# Patient Record
Sex: MALE | Race: WHITE | NOT HISPANIC OR LATINO | Employment: UNEMPLOYED | URBAN - METROPOLITAN AREA
[De-identification: names, ages, dates, MRNs, and addresses within clinical notes are randomized per-mention and may not be internally consistent; named-entity substitution may affect disease eponyms.]

---

## 2024-01-08 ENCOUNTER — OFFICE VISIT (OUTPATIENT)
Dept: PULMONOLOGY | Facility: CLINIC | Age: 29
End: 2024-01-08
Payer: COMMERCIAL

## 2024-01-08 VITALS
SYSTOLIC BLOOD PRESSURE: 138 MMHG | WEIGHT: 274.19 LBS | OXYGEN SATURATION: 98 % | HEART RATE: 83 BPM | DIASTOLIC BLOOD PRESSURE: 76 MMHG | TEMPERATURE: 97.8 F

## 2024-01-08 DIAGNOSIS — Z78.9 NON-SMOKER: ICD-10-CM

## 2024-01-08 DIAGNOSIS — R91.1 PULMONARY NODULE: Primary | ICD-10-CM

## 2024-01-08 DIAGNOSIS — R59.0 MEDIASTINAL LYMPHADENOPATHY: ICD-10-CM

## 2024-01-08 PROCEDURE — 94726 PLETHYSMOGRAPHY LUNG VOLUMES: CPT | Performed by: INTERNAL MEDICINE

## 2024-01-08 PROCEDURE — 99204 OFFICE O/P NEW MOD 45 MIN: CPT | Performed by: INTERNAL MEDICINE

## 2024-01-08 PROCEDURE — 94010 BREATHING CAPACITY TEST: CPT | Performed by: INTERNAL MEDICINE

## 2024-01-08 PROCEDURE — 94729 DIFFUSING CAPACITY: CPT | Performed by: INTERNAL MEDICINE

## 2024-01-08 RX ORDER — FLUTICASONE PROPIONATE 50 MCG
2 SPRAY, SUSPENSION (ML) NASAL DAILY
COMMUNITY
Start: 2023-12-05

## 2024-01-08 RX ORDER — CHOLECALCIFEROL (VITAMIN D3) 1250 MCG
50000 CAPSULE ORAL
COMMUNITY
Start: 2023-12-06

## 2024-01-08 NOTE — PROGRESS NOTES
PULMONARY  NOTE    Chief Complaint     Febrile illness, persistent cough, abnormal CT scan of the chest    History of Present Illness     28-year-old male referred for an abnormal CT scan of the chest    He is a non-smoker with no past history of known lung disease    He was in his usual state of good health when he was on his honeymoon in [place]  He developed a fever  He developed a persistent cough  He had a persistent cough for about 2 months and the cough is now better  No further fevers or chills    He underwent a chest x-ray which was abnormal followed by a CT scan of the chest  Those results are as noted below    He had no rashes and no overt joint swelling or warmth but has had soreness in his wrists    He has cats as pets    No other exposure history    No prior chest imaging studies    Patient Active Problem List   Diagnosis    Pulmonary nodule (RUL Incidental)    Mediastinal lymphadenopathy    Non-smoker      Not on File    Current Outpatient Medications:     Cholecalciferol (Vitamin D3) 1.25 MG (67621 UT) capsule, Take 1 capsule by mouth Every 7 (Seven) Days., Disp: , Rfl:     fluticasone (FLONASE) 50 MCG/ACT nasal spray, 2 sprays into the nostril(s) as directed by provider Daily., Disp: , Rfl:   MEDICATION LIST AND ALLERGIES REVIEWED.    Family History   Problem Relation Age of Onset    Hypertension Father      Social History     Tobacco Use    Smoking status: Never     Passive exposure: Never   Vaping Use    Vaping Use: Never used   Substance Use Topics    Alcohol use: Never    Drug use: Never     Social History     Social History Narrative        Works as a musician/drummer    Non-smoker    Has cats     FAMILY AND SOCIAL HISTORY REVIEWED.    Review of Systems  IF PRESENT REFER TO SCANNED ROS SHEET FROM SAME DATE  OTHERWISE ROS OBTAINED AND NON-CONTRIBUTORY OVER HPI.    /76   Pulse 83   Temp 97.8 °F (36.6 °C)   Wt 124 kg (274 lb 3 oz)   SpO2 98%   Physical Exam  Vitals and nursing  note reviewed.   Constitutional:       General: He is not in acute distress.     Appearance: He is well-developed. He is not diaphoretic.   HENT:      Head: Normocephalic and atraumatic.   Neck:      Thyroid: No thyromegaly.   Cardiovascular:      Rate and Rhythm: Normal rate and regular rhythm.      Heart sounds: Normal heart sounds. No murmur heard.  Pulmonary:      Effort: Pulmonary effort is normal.      Breath sounds: Normal breath sounds. No stridor.   Lymphadenopathy:      Cervical: No cervical adenopathy.      Upper Body:      Right upper body: No supraclavicular or epitrochlear adenopathy.      Left upper body: No supraclavicular or epitrochlear adenopathy.   Skin:     General: Skin is warm and dry.   Neurological:      Mental Status: He is alert and oriented to person, place, and time.   Psychiatric:         Behavior: Behavior normal.         Results     CT scan of the chest done at [facility] from 12/20/2023 reviewed on PACS  Irregular right upper lobe nodule approximately 25 mm in size with mediastinal adenopathy, some nearly 2 cm in size.  Also either adenopathy high in the mediastinum or extension of the left thyroid lobe into the mediastinum    PFTs today reveal no airway obstruction, no restriction, normal diffusion capacity    Immunization History   Administered Date(s) Administered    COVID-19 (MODERNA) 1st,2nd,3rd Dose Monovalent 04/10/2021, 05/10/2021    COVID-19 (MODERNA) Monovalent Original Booster 12/14/2021    COVID-19 (PFIZER) BIVALENT 12+YRS 10/26/2022    COVID-19 F23 (PFIZER) 12YRS+ (COMIRNATY) 10/30/2023    Hepatitis A 08/10/2022    Influenza Injectable Mdck Pf Quad 10/30/2023    Tdap 07/13/2006, 08/10/2022, 08/11/2022     Problem List       ICD-10-CM ICD-9-CM   1. Pulmonary nodule (RUL Incidental)  R91.1 793.11   2. Mediastinal lymphadenopathy  R59.0 785.6   3. Non-smoker  Z78.9 V49.89       Discussion     He had an acute febrile illness with a persistent cough and associated  radiographic changes that I suspect probably represent an inflammatory/infectious process.  He did not have other findings, such as erythema nodosum to suggest sarcoidosis.  Sarcoidosis would be unlikely to give a solitary lung lesion    My suspicion is that this represents a community-acquired infection  He is symptomatically improved  We discussed getting lab tests but there is no lab test that we will definitively identify a cause  I have suggested a short interval follow-up CT scan of the chest, in February, and based on those results we will consider further workup  He would need to have a repeat CT scan of the chest, anyway, if we wanted to do a biopsy because a biopsy of this right upper lobe lesion would require a navigational assistance.    I will just see him back next month with a repeat CT scan of the chest    Moderate level of Medical Decision Making complexity based on 2 or more chronic stable illnesses and an independent review of test results and/or prescription drug management.    Umesh Galvan MD  Note electronically signed    CC: Jimena To DO

## 2024-02-12 ENCOUNTER — HOSPITAL ENCOUNTER (OUTPATIENT)
Dept: CT IMAGING | Facility: HOSPITAL | Age: 29
Discharge: HOME | End: 2024-02-12
Admitting: INTERNAL MEDICINE
Payer: COMMERCIAL

## 2024-02-12 DIAGNOSIS — R91.1 PULMONARY NODULE: ICD-10-CM

## 2024-02-12 PROCEDURE — 71250 CT THORAX DX C-: CPT

## 2024-02-14 ENCOUNTER — DOCUMENTATION (OUTPATIENT)
Dept: PULMONOLOGY | Facility: CLINIC | Age: 29
End: 2024-02-14
Payer: COMMERCIAL

## 2024-02-27 ENCOUNTER — DOCUMENTATION (OUTPATIENT)
Dept: PULMONOLOGY | Facility: CLINIC | Age: 29
End: 2024-02-27
Payer: COMMERCIAL

## 2024-03-14 ENCOUNTER — DOCUMENTATION (OUTPATIENT)
Dept: PULMONOLOGY | Facility: CLINIC | Age: 29
End: 2024-03-14
Payer: COMMERCIAL

## 2024-03-14 NOTE — PROGRESS NOTES
I began tried to call the patient several times and only gotten voicemail.  I have left messages asking him to call to discuss results    At this point we will just try to get him into the office for a follow-up appointment    I will ask my office staff to make those arrangements   Telephone Encounter by Sabine Ravi at 11/15/18 11:54 AM     Author:  Sabine Ravi Service:  (none) Author Type:  Patient      Filed:  11/15/18 11:55 AM Encounter Date:  11/15/2018 Status:  Signed     :  Sabine Ravi (Patient )            Patient's wife is returning phone call, please advise.[TB1.1M]      Revision History        User Key Date/Time User Provider Type Action    > TB1.1 11/15/18 11:55 AM Sabine Ravi Patient  Sign    M - Manual

## 2024-03-14 NOTE — Clinical Note
Can we try to get a hold of this stephany to get a follow-up office appointment?  I cannot seem to get a hold of him to discuss his CAT scan results.  I am not sure if I saw him in [place] or [place].  Sorry to bother you.

## 2024-03-21 ENCOUNTER — TELEMEDICINE (OUTPATIENT)
Dept: PULMONOLOGY | Facility: CLINIC | Age: 29
End: 2024-03-21
Payer: COMMERCIAL

## 2024-03-21 DIAGNOSIS — R59.0 MEDIASTINAL LYMPHADENOPATHY: ICD-10-CM

## 2024-03-21 DIAGNOSIS — Z78.9 NON-SMOKER: ICD-10-CM

## 2024-03-21 DIAGNOSIS — R91.8 PULMONARY NODULES: Primary | ICD-10-CM

## 2024-03-21 PROCEDURE — 99214 OFFICE O/P EST MOD 30 MIN: CPT | Performed by: NURSE PRACTITIONER

## 2024-03-21 NOTE — PROGRESS NOTES
[facility] Pulmonary Video Visit    CHIEF COMPLAINT    Pulmonary nodules    This provider is located at the [facility] (through [facility]), [address], [place] using a secure [employer] Video Visit through [employer]. Patient is being seen remotely via telehealth at their home address in [place], and stated they are in a secure environment for this session. The patient's condition being diagnosed/treated is appropriate for telemedicine. The provider identified herself as well as her credentials. The patient, and/or patients guardian, consent to be seen remotely, and when consent is given they understand that the consent allows for patient identifiable information to be sent to a third party as needed. They may refuse to be seen remotely at any time. The electronic data is encrypted and password protected, and the patient and/or guardian has been advised of the potential risks to privacy not withstanding such measures.    You have chosen to receive care through a telehealth visit. Do you consent to use a video/audio connection for your medical care today? Yes    I have reviewed the E-Visit questionnaire and the patient's answers, my assessment and plan are listed below.     Subjective     HISTORY OF PRESENT ILLNESS    Noe Horan is a 28 y.o.male was seen via Video Visit today for follow-up regarding some pulmonary nodules.     The patient was last seen by Dr. Galvan in January 2024 where he was referred for an abnormal CT of the chest after developing an acute fever and persistent cough when vacationing in [place].    CT of the chest showed a slight interval decrease in the right upper lobe nodule that could be due to difference in slice selection, 3 mm satellite nodule medial to the larger lesion, and stable 5 mm right upper lobe nodule.    Per the chart Dr. Galvan has attempted to contact the patient numerous times to discuss the CT results.     The cough has  resolved and overall feels that his symptoms have resolved. No other respiratory complaints.     Denies fever, chills, night sweats, or hemoptysis. No recent sick contacts. No chest pain or palpitations. Denies lower extremity swelling or calf tenderness.     MEDICATION LIST AND ALLERGIES REVIEWED.    FAMILY AND SOCIAL HISTORY REVIEWED.    Objective     Immunization History   Administered Date(s) Administered    COVID-19 (MODERNA) 1st,2nd,3rd Dose Monovalent 04/10/2021, 05/10/2021    COVID-19 (MODERNA) Monovalent Original Booster 12/14/2021    COVID-19 (PFIZER) BIVALENT 12+YRS 10/26/2022    COVID-19 F23 (PFIZER) 12YRS+ (COMIRNATY) 10/30/2023    Hepatitis A 08/10/2022    Influenza Injectable Mdck Pf Quad 10/30/2023    Tdap 07/13/2006, 08/10/2022, 08/11/2022       Physical exam unable to be completed secondary to nature visit  Appeared in no acute distress, no difficulty with conversation.    Oriented to person, place and time.   Normal respiratory effort.      RESULTS    CT Chest Without Contrast Diagnostic    Result Date: 2/14/2024  Impression: 1. Slight interval decrease in size in the peripheral irregular right upper lobe nodule. However, this could be partially due to differences in technique. Small 3 mm satellite nodule medial to the larger lesion was likely present previously but not as well seen due to slice selection. There is a stable appearance of another 5 mm right upper lobe nodule. Given the persistence of the right upper lobe irregular nodule, would recommend PET/CT for further assessment. 2. Persistent enlarged mediastinal and right hilar lymph nodes. 3. Left thyroid nodule, thyroid ultrasound is recommended. Electronically Signed: Nikkie Jensen MD  2/14/2024 2:23 PM EST  Workstation ID: [id number]    Assessment & Plan     PROBLEM LIST    Problem List Items Addressed This Visit       Pulmonary nodule (RUL Incidental) - Primary    Mediastinal lymphadenopathy    Non-smoker    Relevant Orders    CT chest  low dose wo     DISCUSSION    Mr. Horan was seen today via Televisit for follow-up regarding some pulmonary nodules incidentally noted on an OSH CT of the chest. At the time he developed a respiratory infection while on vacation. Symptoms have now fully resolved.     We discussed repeat CT imaging in 3 months versus proceeding with RUL nodule biopsy. As he is asymptomatic he wishes to repeat the CT imaging. I will contact him with those results.     LDCT of the chest ordered and anticipated for mid-May.     RTC as needed.     I personally spent a total of 31 minutes on patient visit today including chart review, face to face with the patient obtaining the history and physical exam, review of pertinent images and tests, counseling and discussion and/or coordination of care as described above, and documentation.  Total time excludes time spent on other separate services such as performing procedures or test interpretation, if applicable.    Electronically signed by RUTH Richter, 03/21/24, 1:47 PM EDT.    Please note that portions of this note were completed with a voice recognition program. Efforts were made to edit the dictations, but occasionally words are mistranscribed.      CC: Jimena To DO

## 2024-04-19 DIAGNOSIS — R91.1 PULMONARY NODULE: Primary | ICD-10-CM
